# Patient Record
Sex: MALE | Race: WHITE | NOT HISPANIC OR LATINO | Employment: FULL TIME | URBAN - METROPOLITAN AREA
[De-identification: names, ages, dates, MRNs, and addresses within clinical notes are randomized per-mention and may not be internally consistent; named-entity substitution may affect disease eponyms.]

---

## 2017-12-26 ENCOUNTER — ALLSCRIPTS OFFICE VISIT (OUTPATIENT)
Dept: OTHER | Facility: OTHER | Age: 52
End: 2017-12-26

## 2017-12-26 DIAGNOSIS — N52.9 MALE ERECTILE DYSFUNCTION: ICD-10-CM

## 2017-12-26 LAB
BILIRUB UR QL STRIP: NORMAL
CLARITY UR: NORMAL
COLOR UR: YELLOW
GLUCOSE (HISTORICAL): NORMAL
HGB UR QL STRIP.AUTO: NORMAL
KETONES UR STRIP-MCNC: NORMAL MG/DL
LEUKOCYTE ESTERASE UR QL STRIP: NORMAL
NITRITE UR QL STRIP: NORMAL
PH UR STRIP.AUTO: 6.5 [PH]
PROT UR STRIP-MCNC: NORMAL MG/DL
SP GR UR STRIP.AUTO: 1.02
UROBILINOGEN UR QL STRIP.AUTO: 0.2

## 2018-01-23 VITALS
WEIGHT: 200 LBS | HEIGHT: 68 IN | BODY MASS INDEX: 30.31 KG/M2 | SYSTOLIC BLOOD PRESSURE: 120 MMHG | DIASTOLIC BLOOD PRESSURE: 76 MMHG

## 2018-12-20 RX ORDER — SILDENAFIL 100 MG/1
TABLET, FILM COATED ORAL AS NEEDED
Refills: 1 | COMMUNITY
Start: 2018-11-08 | End: 2019-11-18 | Stop reason: SDUPTHER

## 2018-12-24 DIAGNOSIS — N52.9 ERECTILE DYSFUNCTION, UNSPECIFIED ERECTILE DYSFUNCTION TYPE: Primary | ICD-10-CM

## 2018-12-26 DIAGNOSIS — N52.9 MALE ERECTILE DYSFUNCTION: ICD-10-CM

## 2019-11-11 ENCOUNTER — TELEPHONE (OUTPATIENT)
Dept: UROLOGY | Facility: MEDICAL CENTER | Age: 54
End: 2019-11-11

## 2019-11-11 DIAGNOSIS — N40.1 BPH WITH URINARY OBSTRUCTION: Primary | ICD-10-CM

## 2019-11-11 DIAGNOSIS — N13.8 BPH WITH URINARY OBSTRUCTION: Primary | ICD-10-CM

## 2019-11-11 NOTE — TELEPHONE ENCOUNTER
Patient calling back to request a PSA order be faxed to the below number anyway      He can be reached at [phone]

## 2019-11-11 NOTE — TELEPHONE ENCOUNTER
LMOM that pt does not need another PSA test and pt can get a copy of the blood work and either bring it to the office or faxed  Pt to call back if any questions

## 2019-11-11 NOTE — TELEPHONE ENCOUNTER
Patient of Dr Gabrielle Rodriguez  Patient states he had a psa test done in 02/2019 and he would like to know does he need another test done if he gets that copy sent to our office  Please advise      Patient also requested lab orders to be faxed to [facility] [phone]  I sent orders over for patient

## 2019-11-14 RX ORDER — OMEPRAZOLE 40 MG/1
CAPSULE, DELAYED RELEASE ORAL
Refills: 0 | COMMUNITY
Start: 2019-10-15

## 2019-11-18 ENCOUNTER — OFFICE VISIT (OUTPATIENT)
Dept: UROLOGY | Facility: MEDICAL CENTER | Age: 54
End: 2019-11-18
Payer: COMMERCIAL

## 2019-11-18 VITALS
HEART RATE: 72 BPM | BODY MASS INDEX: 31.7 KG/M2 | WEIGHT: 214 LBS | SYSTOLIC BLOOD PRESSURE: 122 MMHG | HEIGHT: 69 IN | DIASTOLIC BLOOD PRESSURE: 88 MMHG

## 2019-11-18 DIAGNOSIS — N52.02 CORPORO-VENOUS OCCLUSIVE ERECTILE DYSFUNCTION: ICD-10-CM

## 2019-11-18 DIAGNOSIS — N13.8 BPH WITH URINARY OBSTRUCTION: Primary | ICD-10-CM

## 2019-11-18 DIAGNOSIS — N40.1 BPH WITH URINARY OBSTRUCTION: Primary | ICD-10-CM

## 2019-11-18 LAB
SL AMB  POCT GLUCOSE, UA: ABNORMAL
SL AMB LEUKOCYTE ESTERASE,UA: ABNORMAL
SL AMB POCT BILIRUBIN,UA: ABNORMAL
SL AMB POCT BLOOD,UA: ABNORMAL
SL AMB POCT CLARITY,UA: CLEAR
SL AMB POCT COLOR,UA: ABNORMAL
SL AMB POCT KETONES,UA: ABNORMAL
SL AMB POCT NITRITE,UA: POSITIVE
SL AMB POCT PH,UA: 5.5
SL AMB POCT SPECIFIC GRAVITY,UA: 1.02
SL AMB POCT URINE PROTEIN: ABNORMAL
SL AMB POCT UROBILINOGEN: 0.2

## 2019-11-18 PROCEDURE — 81003 URINALYSIS AUTO W/O SCOPE: CPT | Performed by: UROLOGY

## 2019-11-18 PROCEDURE — 99214 OFFICE O/P EST MOD 30 MIN: CPT | Performed by: UROLOGY

## 2019-11-18 PROCEDURE — 87086 URINE CULTURE/COLONY COUNT: CPT | Performed by: UROLOGY

## 2019-11-18 RX ORDER — SILDENAFIL 100 MG/1
100 TABLET, FILM COATED ORAL AS NEEDED
Qty: 10 TABLET | Refills: 11 | Status: SHIPPED | OUTPATIENT
Start: 2019-11-18

## 2019-11-18 RX ORDER — SILDENAFIL 100 MG/1
100 TABLET, FILM COATED ORAL DAILY PRN
Qty: 30 TABLET | Refills: 5 | Status: SHIPPED | OUTPATIENT
Start: 2019-11-18

## 2019-11-18 NOTE — PROGRESS NOTES
Assessment/Plan:  1  BPH with lower urinary tract symptoms-AUA symptom score 6 with a 2/5 for frequency 1/5 for feeling of incomplete emptying intermittency and urgency with nocturia once nightly -no intervention-PSA AARON normal not suspicious    2  Erectile dysfunction-responding nicely to sildenafil  Prescriptions E prescribed and printed    No problem-specific Assessment & Plan notes found for this encounter  Diagnoses and all orders for this visit:    BPH with urinary obstruction  -     POCT urine dip auto non-scope  -     Urine culture; Future  -     PSA Total, Diagnostic; Future  -     Comprehensive metabolic panel; Future    Corporo-venous occlusive erectile dysfunction  -     sildenafil (VIAGRA) 100 mg tablet; Take 1 tablet (100 mg total) by mouth as needed for erectile dysfunction  -     sildenafil (VIAGRA) 100 mg tablet; Take 1 tablet (100 mg total) by mouth daily as needed for erectile dysfunction    Other orders  -     omeprazole (PriLOSEC) 40 MG capsule          Subjective:      Patient ID: Gentry Min is a 49 y o  male  HPI  81-year-old male well known to me who presented for evaluation and treatment of erectile dysfunction  The patient has been utilizing sildenafil anywhere from  mg on an as-needed basis without side effects with an excellent response  He requests a repeat prescription  The patient also notes minimal voiding abnormalities including nocturia once nightly with an AUA symptom score of only 6  He denies gross hematuria dysuria and incontinence and presents for prostate examination  The following portions of the patient's history were reviewed and updated as appropriate: allergies, current medications, past family history, past medical history, past social history, past surgical history and problem list     Review of Systems   Gastrointestinal:        Ge relux  Colon polyp   All other systems reviewed and are negative          Objective:      /88   Pulse 72  5' 9" (1 753 m)   Wt 97 1 kg (214 lb)   BMI 31 60 kg/m²          Physical Exam   Constitutional: He is oriented to person, place, and time  He appears well-developed and well-nourished  No distress  HENT:   Head: Normocephalic and atraumatic  Eyes: EOM are normal    Neck: Neck supple  Abdominal: Soft  He exhibits no distension  Genitourinary:   Genitourinary Comments: Phallus normal circumcised without cutaneous lesions  Meatus patent normally placed  Scrotum normal without cutaneous lesions  Testes adnexa palpably normal without masses or adnexal abnormality  No palpable tenderness or fluid collection noted  AARON normal anal verge except for external nonthrombosed hemorrhoids  Normal anal sphincter tone  No palpable rectal masses  Prostate 25 g a nodular nontender   Neurological: He is alert and oriented to person, place, and time  Skin: He is not diaphoretic  Psychiatric: He has a normal mood and affect  His behavior is normal  Judgment and thought content normal    Vitals reviewed

## 2019-11-19 LAB — BACTERIA UR CULT: NORMAL

## 2020-05-06 ENCOUNTER — TELEPHONE (OUTPATIENT)
Dept: UROLOGY | Facility: MEDICAL CENTER | Age: 55
End: 2020-05-06

## 2021-09-16 ENCOUNTER — TELEPHONE (OUTPATIENT)
Dept: UROLOGY | Facility: MEDICAL CENTER | Age: 56
End: 2021-09-16

## 2021-09-16 DIAGNOSIS — N13.8 BPH WITH URINARY OBSTRUCTION: Primary | ICD-10-CM

## 2021-09-16 DIAGNOSIS — N40.1 BPH WITH URINARY OBSTRUCTION: Primary | ICD-10-CM

## 2021-09-16 NOTE — TELEPHONE ENCOUNTER
LMOM with reminder for patient to have blood work done prior to his upcoming visit with [facility]

## 2021-09-22 ENCOUNTER — OFFICE VISIT (OUTPATIENT)
Dept: UROLOGY | Facility: MEDICAL CENTER | Age: 56
End: 2021-09-22
Payer: COMMERCIAL

## 2021-09-22 VITALS
HEART RATE: 78 BPM | DIASTOLIC BLOOD PRESSURE: 70 MMHG | HEIGHT: 68 IN | SYSTOLIC BLOOD PRESSURE: 120 MMHG | BODY MASS INDEX: 30.46 KG/M2 | WEIGHT: 201 LBS

## 2021-09-22 DIAGNOSIS — N40.1 BPH WITH URINARY OBSTRUCTION: Primary | ICD-10-CM

## 2021-09-22 DIAGNOSIS — N13.8 BPH WITH URINARY OBSTRUCTION: Primary | ICD-10-CM

## 2021-09-22 LAB
SL AMB  POCT GLUCOSE, UA: NORMAL
SL AMB LEUKOCYTE ESTERASE,UA: NORMAL
SL AMB POCT BILIRUBIN,UA: NORMAL
SL AMB POCT BLOOD,UA: NORMAL
SL AMB POCT CLARITY,UA: CLEAR
SL AMB POCT COLOR,UA: YELLOW
SL AMB POCT KETONES,UA: NORMAL
SL AMB POCT NITRITE,UA: NORMAL
SL AMB POCT PH,UA: 6
SL AMB POCT SPECIFIC GRAVITY,UA: >=1.03
SL AMB POCT URINE PROTEIN: NORMAL
SL AMB POCT UROBILINOGEN: 0.2

## 2021-09-22 PROCEDURE — 81003 URINALYSIS AUTO W/O SCOPE: CPT | Performed by: PHYSICIAN ASSISTANT

## 2021-09-22 PROCEDURE — 99214 OFFICE O/P EST MOD 30 MIN: CPT | Performed by: PHYSICIAN ASSISTANT

## 2021-09-22 RX ORDER — IBUPROFEN AND FAMOTIDINE 800; 26.6 MG/1; MG/1
TABLET, COATED ORAL 3 TIMES DAILY
COMMUNITY

## 2021-09-22 RX ORDER — ASPIRIN 325 MG
TABLET ORAL DAILY
COMMUNITY

## 2021-09-22 RX ORDER — LIDOCAINE HYDROCHLORIDE 10 MG/ML
INJECTION, SOLUTION INFILTRATION; PERINEURAL
COMMUNITY

## 2021-09-22 RX ORDER — AZELASTINE HCL 205.5 UG/1
SPRAY NASAL
COMMUNITY

## 2021-09-22 NOTE — PROGRESS NOTES
9/22/2021      Chief Complaint   Patient presents with    Benign Prostatic Hypertrophy    Prostate Check    Erectile Dysfunction    Penis / Scrotum Problem         Assessment and Plan    55 y o  male managed by Dr Briana Cordoba     1  BPH with lower urinary tract symptoms   · Urine today: trace blood  · Will send out for UA micro   · AUA score: 6   · Patient currently happy with voiding status  · Reviewed bladder irritants  · Encourage daily water intake of 40-60 ounces  2  Prostate cancer screening  · PSA yet to be collected this year  · Previous PSAs: 0 5 (2019)  · AARON today revealed smooth prostate without appreciable nodule, induration, or asymmetry  · Patient is aware to wait 2 weeks prior to having PSA testing performed  · Also reviewed activities to avoid 72 hours prior to testing  · Will follow-up with results  3  Erectile dysfunction  · Managed by sildenafil 100 mg PO daily prn  · No refills needed at this time  4  Penile itching  · 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    · On exam, no suspicious lesions, warts, scaling, or abnormal erythema appreciated  Documentation via photograph provided in media section on EMR with patient's consent  · Will have photo evaluated by MD for further recommendations  History of Present Illness  Sharyn Cyr is a 55 y o  male here for evaluation of PH, prostate cancer screening, and ED  Patient has a history HSV 2 recently had a relationship with a female who was also HSV 2 positive  Both individuals underwent STI testing prior to sexual interaction and were negative for all other STIs  Patient states they did have protected sex back in March of this year  He states about 2 months ago he started to have itching the base of his glans penis for about 1 week  This had subsided and returned about 2-3 weeks later  This 2nd episode again lasted for about a week  He states he has not had any itching since    He is unsure if there are any visible lesions or warts  He denies any associated fevers or chills  He denies any pain or penile discharge  Status perspective, patient denies any dysuria, frequency, or urgency  He rarely has nocturia  He denies sensation of incomplete bladder emptying or issues with incontinence  He has yet to have his PSA drawn this year  He continues to success with sildenafil 100 milligrams  Review of Systems   Constitutional: Negative for chills and fever  HENT: Negative  Respiratory: Negative  Cardiovascular: Negative  Gastrointestinal: Negative for abdominal pain, nausea and vomiting  Genitourinary: Negative for difficulty urinating, dysuria, flank pain, frequency, hematuria, penile pain (has been having penile itching x 2 months), penile swelling, scrotal swelling, testicular pain and urgency  Rarely has nocturia once per night  Denies sensation of incomplete bladder emptying  Denies issues with incontinence  Musculoskeletal: Negative  AUA SYMPTOM SCORE      Most Recent Value   AUA SYMPTOM SCORE   How often have you had a sensation of not emptying your bladder completely after you finished urinating? 1   How often have you had to urinate again less than two hours after you finished urinating? 2   How often have you found you stopped and started again several times when you urinate? 1   How often have you found it difficult to postpone urination? 0   How often have you had a weak urinary stream?  0   How often have you had to push or strain to begin urination? 1   How many times did you most typically get up to urinate from the time you went to bed at night until the time you got up in the morning?   1   Quality of Life: If you were to spend the rest of your life with your urinary condition just the way it is now, how would you feel about that?  1   AUA SYMPTOM SCORE  6         Vitals  Vitals:    09/22/21 1406   BP: 120/70   Pulse: 78   Weight: 91 2 kg (201 lb) Height: 5' 8" (1 727 m)     Physical Exam  Vitals reviewed  Constitutional:       General: He is not in acute distress  Appearance: Normal appearance  He is not ill-appearing, toxic-appearing or diaphoretic  HENT:      Head: Normocephalic and atraumatic  Eyes:      Conjunctiva/sclera: Conjunctivae normal    Pulmonary:      Effort: Pulmonary effort is normal  No respiratory distress  Abdominal:      General: There is no distension  Palpations: Abdomen is soft  Tenderness: There is no abdominal tenderness  There is no guarding or rebound  Genitourinary:     Comments: Circumcised penis, normal phallus, orthotopic patent meatus  No suspicious lesions, warts, scaling, or abnormal erythema appreciated on glans penis or shaft of penis  Testes smooth descended bilaterally into the scrotum nontender with no palpable mass  Digital rectal exam revealed smooth prostate, without appreciable nodule, induration or asymmetry    Musculoskeletal:         General: Normal range of motion  Cervical back: Normal range of motion  Skin:     General: Skin is warm and dry  Neurological:      General: No focal deficit present  Mental Status: He is alert and oriented to person, place, and time  Psychiatric:         Mood and Affect: Mood normal          Behavior: Behavior normal          Thought Content:  Thought content normal          Judgment: Judgment normal        Past History  Past Medical History:   Diagnosis Date    Benign prostatic hyperplasia without lower urinary tract symptoms     Corporo-venous occlusive erectile dysfunction     Herpes simplex     Sore muscles      Social History     Socioeconomic History    Marital status: Single     Spouse name: None    Number of children: None    Years of education: None    Highest education level: None   Occupational History    Occupation:    Tobacco Use    Smoking status: Former Smoker     Years: 20 00     Types: Cigarettes  Smokeless tobacco: Never Used   Substance and Sexual Activity    Alcohol use: Yes     Comment: Drinks socially   Drug use: No    Sexual activity: None   Other Topics Concern    None   Social History Narrative    None     Social Determinants of Health     Financial Resource Strain:     Difficulty of Paying Living Expenses:    Food Insecurity:     Worried About Running Out of Food in the Last Year:     [address] in the Last Year:    Transportation Needs:     Lack of Transportation (Medical):  Lack of Transportation (Non-Medical):    Physical Activity:     Days of Exercise per Week:     Minutes of Exercise per Session:    Stress:     Feeling of Stress :    Social Connections:     Frequency of Communication with Friends and Family:     Frequency of Social Gatherings with Friends and Family:     Attends Mandaen Services:     Active Member of Clubs or Organizations:     Attends Club or Organization Meetings:     Marital Status:    Intimate Partner Violence:     Fear of Current or Ex-Partner:     Emotionally Abused:     Physically Abused:     Sexually Abused:      Social History     Tobacco Use   Smoking Status Former Smoker    Years: 20 00    Types: Cigarettes   Smokeless Tobacco Never Used     Family History   Problem Relation Age of Onset    Testicular cancer Father     Cancer Maternal Grandmother        The following portions of the patient's history were reviewed and updated as appropriate: allergies, current medications, past medical history, past social history, past surgical history and problem list     Results  No results found for this or any previous visit (from the past 1 hour(s))  ]  No results found for: PSA  No results found for: GLUCOSE, CALCIUM, NA, K, CO2, CL, BUN, CREATININE  No results found for: WBC, HGB, HCT, MCV, PLT    Kelly Kennedy PA-C

## 2022-01-18 ENCOUNTER — TELEPHONE (OUTPATIENT)
Dept: UROLOGY | Facility: MEDICAL CENTER | Age: 57
End: 2022-01-18

## 2022-01-18 NOTE — TELEPHONE ENCOUNTER
Patient seen by [facility] at [facility]    Patient called stating he is having itching , redness around of penis  and has white flaky skin around it  He is very concerned he did go to his family doctor and he prescribed a cream and it made it worse  Patient stated he a lump also by the head of penis  He wants to know how to proceed

## 2022-01-18 NOTE — TELEPHONE ENCOUNTER
I will prescribe Mycolog-II cream for the patient to use b i d  Which has an antifungal and steroid    He can be scheduled next week with any available provider if he is not improving

## 2022-01-18 NOTE — TELEPHONE ENCOUNTER
Returned call to patient   Patient states that he has the same issue that he had on visit from 9/22/21  Patient states it has spread more to head  It gets inflamed, itching, with bumps/ skin is white in some areas and flaking off  Patient states the itching is terrible  Patient called pcp nystatin cream which patient started to use a few weeks ago from and it seems to make it worse  Patient states it is spreading  Patient states has not been using an new soaps, Deoderant or laundry detergents  Patient states he is not sure what is going on  Is requesting an appt to be seen       Pharmacy confirmed as :   [facility], [address] [place] [address], [address] [facility] [address]   Phone:  [phone]  Fax:  [phone]

## 2022-01-18 NOTE — TELEPHONE ENCOUNTER
Returned call to patient advised with Dr recommendations  Patient states he will try the prescriptions  Advised patient on correct administration  Patient verbalized understanding/ agreement

## 2022-03-16 NOTE — TELEPHONE ENCOUNTER
Returned call to patient  As per Dr Angeli Moreno note patient was to be seen for evaluation if his symptoms did not resolve     Patient was given an appt for 3/17/22 in our [facility] location with GASTON

## 2022-03-16 NOTE — TELEPHONE ENCOUNTER
Patient called in again complaining of red, flaky, painful/itching on top of penis  He was given Mycolog cream and uses it for a few weeks then stops applying it; it clears up, but then the symptoms return  Patient stated that he isn't sure if he needs to come in, have a skin scraping/sample taken, or if he should continue with the cream  Denies any fever or urinary symptoms  Advised patient to not apply the cream until he heard back from the office on how to proceed  Patient agreeable

## 2022-03-17 ENCOUNTER — APPOINTMENT (OUTPATIENT)
Dept: LAB | Facility: HOSPITAL | Age: 57
End: 2022-03-17
Payer: COMMERCIAL

## 2022-03-17 ENCOUNTER — OFFICE VISIT (OUTPATIENT)
Dept: UROLOGY | Facility: MEDICAL CENTER | Age: 57
End: 2022-03-17
Payer: COMMERCIAL

## 2022-03-17 VITALS
SYSTOLIC BLOOD PRESSURE: 110 MMHG | HEART RATE: 78 BPM | BODY MASS INDEX: 30.46 KG/M2 | WEIGHT: 201 LBS | DIASTOLIC BLOOD PRESSURE: 80 MMHG | HEIGHT: 68 IN

## 2022-03-17 DIAGNOSIS — N48.9 PENILE LESION: ICD-10-CM

## 2022-03-17 DIAGNOSIS — N13.8 BPH WITH URINARY OBSTRUCTION: ICD-10-CM

## 2022-03-17 DIAGNOSIS — N48.9 PENILE LESION: Primary | ICD-10-CM

## 2022-03-17 DIAGNOSIS — N40.1 BPH WITH URINARY OBSTRUCTION: ICD-10-CM

## 2022-03-17 LAB
SL AMB  POCT GLUCOSE, UA: NORMAL
SL AMB LEUKOCYTE ESTERASE,UA: NORMAL
SL AMB POCT BILIRUBIN,UA: NORMAL
SL AMB POCT BLOOD,UA: NORMAL
SL AMB POCT CLARITY,UA: NORMAL
SL AMB POCT COLOR,UA: YELLOW
SL AMB POCT KETONES,UA: NORMAL
SL AMB POCT NITRITE,UA: NORMAL
SL AMB POCT PH,UA: 6.5
SL AMB POCT SPECIFIC GRAVITY,UA: 1.02
SL AMB POCT URINE PROTEIN: NORMAL
SL AMB POCT UROBILINOGEN: 0.2

## 2022-03-17 PROCEDURE — 86695 HERPES SIMPLEX TYPE 1 TEST: CPT

## 2022-03-17 PROCEDURE — 36415 COLL VENOUS BLD VENIPUNCTURE: CPT

## 2022-03-17 PROCEDURE — 86592 SYPHILIS TEST NON-TREP QUAL: CPT

## 2022-03-17 PROCEDURE — 87491 CHLMYD TRACH DNA AMP PROBE: CPT | Performed by: NURSE PRACTITIONER

## 2022-03-17 PROCEDURE — 87591 N.GONORRHOEAE DNA AMP PROB: CPT | Performed by: NURSE PRACTITIONER

## 2022-03-17 PROCEDURE — 86696 HERPES SIMPLEX TYPE 2 TEST: CPT

## 2022-03-17 PROCEDURE — 99214 OFFICE O/P EST MOD 30 MIN: CPT | Performed by: NURSE PRACTITIONER

## 2022-03-17 PROCEDURE — 81003 URINALYSIS AUTO W/O SCOPE: CPT | Performed by: NURSE PRACTITIONER

## 2022-03-17 RX ORDER — CLOTRIMAZOLE AND BETAMETHASONE DIPROPIONATE 10; .64 MG/G; MG/G
CREAM TOPICAL 2 TIMES DAILY
Qty: 30 G | Refills: 2 | Status: SHIPPED | OUTPATIENT
Start: 2022-03-17

## 2022-03-17 NOTE — PROGRESS NOTES
3/17/2022    Assessment and Plan    55 y o  male managed by our office    1  Penile lesion  · Will repeat STI testing  · Clotrimazole/betamethasone prescription provided  · Will provide dermatology consult/referral  · Will call patient with results of STI testing  · Advised to avoid sexual activity until resolution of current lesion and irritation    2  Prostate cancer screening  · PSA and AARON due 09/2022    3  Erectile dysfunction  · Continue sildenafil    History of Present Illness  Panchito Andrade is a 55 y o  male here for follow up evaluation of  penile irritation lesion  Patient has been previously managed by our office with STI testing with negative results  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  He continues to complain of complaints of redness, and itching to his penile shaft and glans  He denies any new sexual encounters  He does report having a previous relationship with someone which was HS the positive as well as him at that time  He was provided topical Lotrisone cream for which he reports may have mildly reduced some of his irritative symptoms who presents the office today with complaints of increased redness and irritation  Review of Systems   Constitutional: Negative for chills and fever  Respiratory: Negative for cough and shortness of breath  Cardiovascular: Negative for chest pain  Gastrointestinal: Negative for abdominal distention, abdominal pain, blood in stool, nausea and vomiting  Genitourinary: Positive for genital sores  Negative for difficulty urinating, dysuria, enuresis, flank pain, frequency, hematuria, penile discharge, penile pain, penile swelling, scrotal swelling, testicular pain and urgency  Skin: Negative for rash  AUA SYMPTOM SCORE      Most Recent Value   AUA SYMPTOM SCORE    How often have you had a sensation of not emptying your bladder completely after you finished urinating?  1   How often have you had to urinate again less than two hours after you finished urinating? 1   How often have you found you stopped and started again several times when you urinate? 1   How often have you found it difficult to postpone urination? 1   How often have you had a weak urinary stream? 0   How often have you had to push or strain to begin urination? 1   How many times did you most typically get up to urinate from the time you went to bed at night until the time you got up in the morning? 1   Quality of Life: If you were to spend the rest of your life with your urinary condition just the way it is now, how would you feel about that? 1   AUA SYMPTOM SCORE 6             Past Medical History  Past Medical History:   Diagnosis Date    Benign prostatic hyperplasia without lower urinary tract symptoms     Corporo-venous occlusive erectile dysfunction     Herpes simplex     Sore muscles        Past Social History  Past Surgical History:   Procedure Laterality Date    KNEE SURGERY Right 05/2021    SEPTOPLASTY      SHOULDER SURGERY      WRIST SURGERY Left      Social History     Tobacco Use   Smoking Status Former Smoker    Years: 20 00    Types: Cigarettes   Smokeless Tobacco Never Used       Past Family History  Family History   Problem Relation Age of Onset    Testicular cancer Father     Cancer Maternal Grandmother        Past Social history  Social History     Socioeconomic History    Marital status: Single     Spouse name: Not on file    Number of children: Not on file    Years of education: Not on file    Highest education level: Not on file   Occupational History    Occupation:    Tobacco Use    Smoking status: Former Smoker     Years: 20 00     Types: Cigarettes    Smokeless tobacco: Never Used   Substance and Sexual Activity    Alcohol use: Yes     Comment: Drinks socially      Drug use: No    Sexual activity: Not on file   Other Topics Concern    Not on file   Social History Narrative    Not on file     Social Determinants of Health Financial Resource Strain: Not on file   Food Insecurity: Not on file   Transportation Needs: Not on file   Physical Activity: Not on file   Stress: Not on file   Social Connections: Not on file   Intimate Partner Violence: Not on file   Housing Stability: Not on file       Current Medications  Current Outpatient Medications   Medication Sig Dispense Refill    aspirin 325 mg tablet Daily        Ibuprofen-Famotidine (Duexis) 800-26 6 MG TABS 3 (three) times a day      sildenafil (VIAGRA) 100 mg tablet Take 1 tablet (100 mg total) by mouth as needed for erectile dysfunction 10 tablet 11    Azelastine HCl 0 15 % SOLN azelastine 205 5 mcg (0 15 %) nasal spray      clotrimazole-betamethasone (LOTRISONE) 1-0 05 % cream Apply topically 2 (two) times a day 30 g 2    lidocaine (XYLOCAINE) 1 % lidocaine HCl 10 mg/mL (1 %) injection solution      nystatin-triamcinolone (MYCOLOG-II) cream Apply topically 2 (two) times a day 30 g 2    omeprazole (PriLOSEC) 40 MG capsule    0    sildenafil (VIAGRA) 100 mg tablet Take 1 tablet (100 mg total) by mouth daily as needed for erectile dysfunction 30 tablet 5     No current facility-administered medications for this visit  Allergies  No Known Allergies      The following portions of the patient's history were reviewed and updated as appropriate: allergies, current medications, past medical history, past social history, past surgical history and problem list       Vitals  Vitals:    03/17/22 1341   BP: 110/80   Pulse: 78   Weight: 91 2 kg (201 lb)   Height: 5' 8" (1 727 m)           Physical Exam  Physical Exam  Vitals reviewed  Constitutional:       General: He is not in acute distress  Appearance: Normal appearance  He is normal weight  HENT:      Head: Normocephalic  Pulmonary:      Effort: No respiratory distress  Breath sounds: Normal breath sounds  Genitourinary:     Testes: Normal          Right: Mass, tenderness or swelling not present  Left: Mass, tenderness or swelling not present  Epididymis:      Right: Normal  Not enlarged  No tenderness  Left: Normal  Not enlarged  No tenderness  Skin:     General: Skin is warm and dry  Neurological:      General: No focal deficit present  Mental Status: He is alert and oriented to person, place, and time  Psychiatric:         Mood and Affect: Mood normal          Behavior: Behavior normal            Results  No results found for this or any previous visit (from the past 1 hour(s))  ]  No results found for: PSA  No results found for: GLUCOSE, CALCIUM, NA, K, CO2, CL, BUN, CREATININE  No results found for: WBC, HGB, HCT, MCV, PLT        Orders  Orders Placed This Encounter   Procedures    Chlamydia/GC amplified DNA by PCR     Order Specific Question:   Release to patient through [employer]     Answer:   Immediate    RPR     Standing Status:   Future     Number of Occurrences:   1     Standing Expiration Date:   3/17/2023    Herpes I/II IgG ROBERT w Reflex to HSV-2     Standing Status:   Future     Number of Occurrences:   1     Standing Expiration Date:   3/17/2023     Order Specific Question:   Release to patient through Mychart     Answer:   Immediate    Ambulatory Referral to Dermatology     Standing Status:   Future     Standing Expiration Date:   3/17/2023     Referral Priority:   ASAP     Referral Type:   Consult - AMB     Referral Reason:   Specialty Services Required     Requested Specialty:   Dermatology     Number of Visits Requested:   1     Expiration Date:   3/17/2023    POCT urine dip auto non-scope       Javier GASTON Caban

## 2022-03-18 LAB
C TRACH DNA SPEC QL NAA+PROBE: NEGATIVE
N GONORRHOEA DNA SPEC QL NAA+PROBE: NEGATIVE
RPR SER QL: NORMAL

## 2022-03-19 LAB
HSV1 IGG SER IA-ACNC: <0.91 INDEX (ref 0–0.9)
HSV2 IGG SER IA-ACNC: 3.87 INDEX (ref 0–0.9)
HSV2 IGG SERPL QL IA: POSITIVE

## 2022-03-21 ENCOUNTER — TELEPHONE (OUTPATIENT)
Dept: UROLOGY | Facility: MEDICAL CENTER | Age: 57
End: 2022-03-21

## 2022-03-21 NOTE — TELEPHONE ENCOUNTER
Attempted to contact patient   Reviewed providers recommendation with patient   Patient states that dermatology did not call him back  He did contact dermatology and left a message   He will reach out to dermatology again  He also contacted his pcp and was given a referral to another dermatology practice   He will follow up with which every practice contacts him first and keep his appt with our provider on April 8

## 2022-03-21 NOTE — TELEPHONE ENCOUNTER
----- Message from An Villatoro  sent at 3/20/2022  8:26 PM EDT -----  Luis Enrique Koroma to notify patient that received the STI testing from his last office visit  He everything is negative with the exception of HSV 2, which is something we have known about and discussed status prior visit    He should continue with his appointment   with dermatology as scheduled

## 2022-04-08 ENCOUNTER — OFFICE VISIT (OUTPATIENT)
Dept: UROLOGY | Facility: MEDICAL CENTER | Age: 57
End: 2022-04-08
Payer: COMMERCIAL

## 2022-04-08 VITALS
DIASTOLIC BLOOD PRESSURE: 80 MMHG | SYSTOLIC BLOOD PRESSURE: 130 MMHG | WEIGHT: 201 LBS | BODY MASS INDEX: 30.46 KG/M2 | HEART RATE: 79 BPM | HEIGHT: 68 IN

## 2022-04-08 DIAGNOSIS — Z12.5 PROSTATE CANCER SCREENING: Primary | ICD-10-CM

## 2022-04-08 PROCEDURE — 99213 OFFICE O/P EST LOW 20 MIN: CPT | Performed by: NURSE PRACTITIONER

## 2022-04-08 NOTE — PROGRESS NOTES
4/8/2022    Assessment and Plan    55 y o  male managed by our office    1  Penile lesion  · Resolving lesions   · Patient reports dermatology evaluation with diagnosis of lichen planus  He was instructed if there is worsening of lesions he is to return to Dermatology for biopsy versus skin scraping    2  Prostate cancer screening  · PSA and AARON due 1/2023    3  Erectile dysfunction  · Continue sildenafil    4  Family history testicular cancer  · Father    History of Present Illness  Sarika Howard is a 55 y o  male here for follow up evaluation of  penile irritation lesion  Patient has been previously managed by our office with STI testing with negative results  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  He continues to complain of complaints of redness, and itching to his penile shaft and glans  He denies any new sexual encounters  He does report having a previous relationship with someone which was HS the positive as well as him at that time  He was provided topical Lotrisone cream for which he reports may have mildly reduced some of his irritative symptoms who presents the office today with complaints of increased redness and irritation  Patient reports being evaluated by Dermatology and diagnosed with lichen planus  He reports since his last office evaluation and use of Lotrisone cream he has had resolution of his irritated skin lesions  Review of Systems   Constitutional: Negative for chills and fever  Respiratory: Negative for cough and shortness of breath  Cardiovascular: Negative for chest pain  Gastrointestinal: Negative for abdominal distention, abdominal pain, blood in stool, nausea and vomiting  Genitourinary: Negative for difficulty urinating, dysuria, enuresis, flank pain, frequency, hematuria and urgency  Skin: Negative for rash             AUA SYMPTOM SCORE      Most Recent Value   AUA SYMPTOM SCORE    How often have you had a sensation of not emptying your bladder completely after you finished urinating? 1   How often have you had to urinate again less than two hours after you finished urinating? 1   How often have you found you stopped and started again several times when you urinate? 1   How often have you found it difficult to postpone urination? 0   How often have you had a weak urinary stream? 0   How often have you had to push or strain to begin urination? 0   How many times did you most typically get up to urinate from the time you went to bed at night until the time you got up in the morning? 0   Quality of Life: If you were to spend the rest of your life with your urinary condition just the way it is now, how would you feel about that? 1   AUA SYMPTOM SCORE 3             Past Medical History  Past Medical History:   Diagnosis Date    Benign prostatic hyperplasia without lower urinary tract symptoms     Corporo-venous occlusive erectile dysfunction     Herpes simplex     Sore muscles        Past Social History  Past Surgical History:   Procedure Laterality Date    KNEE SURGERY Right 05/2021    SEPTOPLASTY      SHOULDER SURGERY      WRIST SURGERY Left      Social History     Tobacco Use   Smoking Status Former Smoker    Years: 20 00    Types: Cigarettes   Smokeless Tobacco Never Used       Past Family History  Family History   Problem Relation Age of Onset    Testicular cancer Father     Cancer Maternal Grandmother        Past Social history  Social History     Socioeconomic History    Marital status: Single     Spouse name: Not on file    Number of children: Not on file    Years of education: Not on file    Highest education level: Not on file   Occupational History    Occupation:    Tobacco Use    Smoking status: Former Smoker     Years: 20 00     Types: Cigarettes    Smokeless tobacco: Never Used   Substance and Sexual Activity    Alcohol use: Yes     Comment: Drinks socially      Drug use: No    Sexual activity: Not on file   Other Topics Concern  Not on file   Social History Narrative    Not on file     Social Determinants of Health     Financial Resource Strain: Not on file   Food Insecurity: Not on file   Transportation Needs: Not on file   Physical Activity: Not on file   Stress: Not on file   Social Connections: Not on file   Intimate Partner Violence: Not on file   Housing Stability: Not on file       Current Medications  Current Outpatient Medications   Medication Sig Dispense Refill    aspirin 325 mg tablet Daily        clotrimazole-betamethasone (LOTRISONE) 1-0 05 % cream Apply topically 2 (two) times a day 30 g 2    nystatin-triamcinolone (MYCOLOG-II) cream Apply topically 2 (two) times a day 30 g 2    sildenafil (VIAGRA) 100 mg tablet Take 1 tablet (100 mg total) by mouth as needed for erectile dysfunction 10 tablet 11    Azelastine HCl 0 15 % SOLN azelastine 205 5 mcg (0 15 %) nasal spray      Ibuprofen-Famotidine (Duexis) 800-26 6 MG TABS 3 (three) times a day      lidocaine (XYLOCAINE) 1 % lidocaine HCl 10 mg/mL (1 %) injection solution      omeprazole (PriLOSEC) 40 MG capsule    0    sildenafil (VIAGRA) 100 mg tablet Take 1 tablet (100 mg total) by mouth daily as needed for erectile dysfunction 30 tablet 5     No current facility-administered medications for this visit  Allergies  No Known Allergies      The following portions of the patient's history were reviewed and updated as appropriate: allergies, current medications, past medical history, past social history, past surgical history and problem list       Vitals  Vitals:    04/08/22 0823   BP: 130/80   Pulse: 79   Weight: 91 2 kg (201 lb)   Height: 5' 8" (1 727 m)           Physical Exam  Physical Exam  Vitals reviewed  Constitutional:       General: He is not in acute distress  Appearance: Normal appearance  He is normal weight  HENT:      Head: Normocephalic  Pulmonary:      Effort: No respiratory distress  Breath sounds: Normal breath sounds  Genitourinary:     Comments: Resolving lesions/skin irritation  No redness noted on exam today  Skin:     General: Skin is warm and dry  Neurological:      General: No focal deficit present  Mental Status: He is alert and oriented to person, place, and time  Psychiatric:         Mood and Affect: Mood normal          Behavior: Behavior normal        Results  No results found for this or any previous visit (from the past 1 hour(s))  ]  No results found for: PSA  No results found for: GLUCOSE, CALCIUM, NA, K, CO2, CL, BUN, CREATININE  No results found for: WBC, HGB, HCT, MCV, PLT    Orders  Orders Placed This Encounter   Procedures    PSA, Total Screen     This is a patient instruction: This test is non-fasting  Please drink two glasses of water morning of bloodwork          Standing Status:   Future     Standing Expiration Date:   10/8/2023       GASTON Elliott

## 2023-10-17 ENCOUNTER — TELEPHONE (OUTPATIENT)
Age: 58
End: 2023-10-17

## 2023-10-17 DIAGNOSIS — Z12.5 PROSTATE CANCER SCREENING: Primary | ICD-10-CM

## 2023-11-22 NOTE — PATIENT INSTRUCTIONS
I spoke with patient regarding sleep test results. Patient has lost 60 lbs since last sleep test, and this is reflected in this study showing mild FROY (improved from previously). She is working on losing more weight and for now she would like to forgo PAP therapy. She will follow up with us in 6 months. She will call to schedule appt.     Domingo Ramos MD Please wait 2 weeks after today's visit to have PSA testing done  My rectal exam can falsely elevate your results       Things to AVOID 72 hours prior to PSA testing: ejaculation and any pressure to the scrotum (riding a bike, riding a motorcycle, riding , etc)

## 2024-01-22 ENCOUNTER — TELEPHONE (OUTPATIENT)
Dept: UROLOGY | Facility: MEDICAL CENTER | Age: 59
End: 2024-01-22

## 2024-01-22 NOTE — TELEPHONE ENCOUNTER
Pt's called and states he had his psa done somewhere else and they were to forward it to us. He is going to contact that lab and see what is going on and have them fax it if they have it.

## 2024-01-30 ENCOUNTER — OFFICE VISIT (OUTPATIENT)
Dept: UROLOGY | Facility: MEDICAL CENTER | Age: 59
End: 2024-01-30
Payer: COMMERCIAL

## 2024-01-30 VITALS
SYSTOLIC BLOOD PRESSURE: 120 MMHG | HEART RATE: 70 BPM | DIASTOLIC BLOOD PRESSURE: 80 MMHG | WEIGHT: 202 LBS | BODY MASS INDEX: 30.62 KG/M2 | HEIGHT: 68 IN

## 2024-01-30 DIAGNOSIS — L43.9 LICHEN PLANUS: ICD-10-CM

## 2024-01-30 DIAGNOSIS — Z12.5 PROSTATE CANCER SCREENING: ICD-10-CM

## 2024-01-30 DIAGNOSIS — N40.0 BENIGN PROSTATIC HYPERPLASIA WITHOUT LOWER URINARY TRACT SYMPTOMS: Primary | ICD-10-CM

## 2024-01-30 DIAGNOSIS — N52.02 CORPORO-VENOUS OCCLUSIVE ERECTILE DYSFUNCTION: ICD-10-CM

## 2024-01-30 LAB
SL AMB  POCT GLUCOSE, UA: ABNORMAL
SL AMB LEUKOCYTE ESTERASE,UA: ABNORMAL
SL AMB POCT BILIRUBIN,UA: ABNORMAL
SL AMB POCT BLOOD,UA: ABNORMAL
SL AMB POCT CLARITY,UA: CLEAR
SL AMB POCT COLOR,UA: ABNORMAL
SL AMB POCT KETONES,UA: ABNORMAL
SL AMB POCT NITRITE,UA: ABNORMAL
SL AMB POCT PH,UA: 6
SL AMB POCT SPECIFIC GRAVITY,UA: 1.03
SL AMB POCT URINE PROTEIN: ABNORMAL
SL AMB POCT UROBILINOGEN: 0.2

## 2024-01-30 PROCEDURE — 81003 URINALYSIS AUTO W/O SCOPE: CPT | Performed by: UROLOGY

## 2024-01-30 PROCEDURE — 99214 OFFICE O/P EST MOD 30 MIN: CPT | Performed by: UROLOGY

## 2024-01-30 RX ORDER — SILDENAFIL 100 MG/1
100 TABLET, FILM COATED ORAL AS NEEDED
Qty: 10 TABLET | Refills: 11 | Status: SHIPPED | OUTPATIENT
Start: 2024-01-30

## 2024-01-30 RX ORDER — METHOCARBAMOL 750 MG/1
TABLET, FILM COATED ORAL
COMMUNITY
Start: 2023-12-04

## 2024-01-30 NOTE — LETTER
January 30, 2024     Don Schwartz  [address] [place] [address]    Patient: Abdifatah Batista   YOB: 1965   Date of Visit: 1/30/2024       Dear Dr. Schwartz:    Thank you for referring Abdifatah Batista to me for evaluation. Below are my notes for this consultation.    If you have questions, please do not hesitate to call me. I look forward to following your patient along with you.         Sincerely,        Fritz Mcqueen MD        CC: No Recipients    Fritz Mcqueen MD  1/30/2024  2:07 PM  Sign when Signing Visit  Assessment/Plan:  #1.  BPH with lower urinary tract symptoms-patient urinating well without obstructive or irritative symptoms.  Current AUA symptom score is only 3    2.  Lichen planus of the penis-followed by dermatology in [place] with excellent success utilizing clotrimazole and betamethasone ointment    3.  Prostate cancer screening-AARON and PSA within acceptable limits without signs or suspicion of malignancy  No problem-specific Assessment & Plan notes found for this encounter.       Diagnoses and all orders for this visit:    Benign prostatic hyperplasia without lower urinary tract symptoms  -     POCT urine dip auto non-scope  -     PSA Total, Diagnostic; Future  -     Comprehensive metabolic panel    Lichen planus    Prostate cancer screening  -     PSA Total, Diagnostic; Future    Other orders  -     methocarbamol (ROBAXIN) 750 mg tablet; take 1 tablet by mouth every 8 hours if needed FOR MUSCLE SPASMS          Subjective:      Patient ID: Abdifatah Batista is a 58 y.o. male.    HPI  58-year-old male known to be herpes simplex virus antibody positive presents after seeing dermatology in [place] for penile lesion which is being treated as if it were lichen planus with good response to betamethasone and clotrimazole ointment.  The patient is voiding adequately without obstructive or irritative symptoms denying dysuria gross hematuria.  His erections are adequate.   "PSA is well within normal limits.  The following portions of the patient's history were reviewed and updated as appropriate: allergies, current medications, past family history, past medical history, past social history, past surgical history, and problem list.    Review of Systems   All other systems reviewed and are negative.        Objective:      /80   Pulse 70   Ht 5' 8\" (1.727 m)   Wt 91.6 kg (202 lb)   BMI 30.71 kg/m²          Physical Exam  Vitals reviewed.   Constitutional:       Appearance: Normal appearance.   HENT:      Head: Normocephalic and atraumatic.      Nose: Nose normal.      Mouth/Throat:      Mouth: Mucous membranes are moist.   Eyes:      Extraocular Movements: Extraocular movements intact.   Pulmonary:      Effort: Pulmonary effort is normal. No respiratory distress.   Abdominal:      General: There is no distension.      Palpations: Abdomen is soft.      Tenderness: There is no abdominal tenderness. There is no guarding.   Genitourinary:     Penis: Normal.       Testes: Normal.      Prostate: Normal.      Rectum: Normal.   Musculoskeletal:      Cervical back: Neck supple.   Skin:     General: Skin is dry.   Neurological:      Mental Status: He is alert and oriented to person, place, and time.   Psychiatric:         Mood and Affect: Mood normal.         Behavior: Behavior normal.         Thought Content: Thought content normal.         Judgment: Judgment normal.           "

## 2024-01-30 NOTE — PROGRESS NOTES
"Assessment/Plan:  #1.  BPH with lower urinary tract symptoms-patient urinating well without obstructive or irritative symptoms.  Current AUA symptom score is only 3    2.  Lichen planus of the penis-followed by dermatology in [place] with excellent success utilizing clotrimazole and betamethasone ointment    3.  Prostate cancer screening-AARON and PSA within acceptable limits without signs or suspicion of malignancy  No problem-specific Assessment & Plan notes found for this encounter.       Diagnoses and all orders for this visit:    Benign prostatic hyperplasia without lower urinary tract symptoms  -     POCT urine dip auto non-scope  -     PSA Total, Diagnostic; Future  -     Comprehensive metabolic panel    Lichen planus    Prostate cancer screening  -     PSA Total, Diagnostic; Future    Other orders  -     methocarbamol (ROBAXIN) 750 mg tablet; take 1 tablet by mouth every 8 hours if needed FOR MUSCLE SPASMS          Subjective:      Patient ID: Abdifatah Batista is a 58 y.o. male.    HPI  58-year-old male known to be herpes simplex virus antibody positive presents after seeing dermatology in [place] for penile lesion which is being treated as if it were lichen planus with good response to betamethasone and clotrimazole ointment.  The patient is voiding adequately without obstructive or irritative symptoms denying dysuria gross hematuria.  His erections are adequate.  PSA is well within normal limits.  The following portions of the patient's history were reviewed and updated as appropriate: allergies, current medications, past family history, past medical history, past social history, past surgical history, and problem list.    Review of Systems   All other systems reviewed and are negative.        Objective:      /80   Pulse 70   Ht 5' 8\" (1.727 m)   Wt 91.6 kg (202 lb)   BMI 30.71 kg/m²          Physical Exam  Vitals reviewed.   Constitutional:       Appearance: Normal appearance.   HENT:      Head: " Normocephalic and atraumatic.      Nose: Nose normal.      Mouth/Throat:      Mouth: Mucous membranes are moist.   Eyes:      Extraocular Movements: Extraocular movements intact.   Pulmonary:      Effort: Pulmonary effort is normal. No respiratory distress.   Abdominal:      General: There is no distension.      Palpations: Abdomen is soft.      Tenderness: There is no abdominal tenderness. There is no guarding.   Genitourinary:     Penis: Normal.       Testes: Normal.      Prostate: Normal.      Rectum: Normal.   Musculoskeletal:      Cervical back: Neck supple.   Skin:     General: Skin is dry.   Neurological:      Mental Status: He is alert and oriented to person, place, and time.   Psychiatric:         Mood and Affect: Mood normal.         Behavior: Behavior normal.         Thought Content: Thought content normal.         Judgment: Judgment normal.